# Patient Record
Sex: MALE | Race: WHITE | NOT HISPANIC OR LATINO
[De-identification: names, ages, dates, MRNs, and addresses within clinical notes are randomized per-mention and may not be internally consistent; named-entity substitution may affect disease eponyms.]

---

## 2021-12-03 ENCOUNTER — TRANSCRIPTION ENCOUNTER (OUTPATIENT)
Age: 36
End: 2021-12-03

## 2022-01-07 ENCOUNTER — OUTPATIENT (OUTPATIENT)
Dept: OUTPATIENT SERVICES | Facility: HOSPITAL | Age: 37
LOS: 1 days | End: 2022-01-07
Payer: OTHER GOVERNMENT

## 2022-01-07 ENCOUNTER — APPOINTMENT (OUTPATIENT)
Dept: MRI IMAGING | Facility: HOSPITAL | Age: 37
End: 2022-01-07

## 2022-01-07 PROBLEM — Z00.00 ENCOUNTER FOR PREVENTIVE HEALTH EXAMINATION: Status: ACTIVE | Noted: 2022-01-07

## 2022-01-07 PROCEDURE — 75561 CARDIAC MRI FOR MORPH W/DYE: CPT

## 2022-01-07 PROCEDURE — 75561 CARDIAC MRI FOR MORPH W/DYE: CPT | Mod: 26

## 2022-01-07 PROCEDURE — A9577: CPT

## 2024-04-19 ENCOUNTER — HOSPITAL ENCOUNTER (EMERGENCY)
Facility: HOSPITAL | Age: 39
Discharge: HOME | End: 2024-04-19
Attending: EMERGENCY MEDICINE
Payer: OTHER GOVERNMENT

## 2024-04-19 ENCOUNTER — APPOINTMENT (OUTPATIENT)
Dept: GENERAL RADIOLOGY | Facility: HOSPITAL | Age: 39
End: 2024-04-19
Payer: OTHER GOVERNMENT

## 2024-04-19 VITALS
DIASTOLIC BLOOD PRESSURE: 62 MMHG | SYSTOLIC BLOOD PRESSURE: 117 MMHG | BODY MASS INDEX: 27.48 KG/M2 | WEIGHT: 160.94 LBS | TEMPERATURE: 97.6 F | HEIGHT: 64 IN | RESPIRATION RATE: 21 BRPM | OXYGEN SATURATION: 98 % | HEART RATE: 58 BPM

## 2024-04-19 DIAGNOSIS — R00.2 PALPITATIONS: Primary | ICD-10-CM

## 2024-04-19 DIAGNOSIS — R00.1 BRADYCARDIA: ICD-10-CM

## 2024-04-19 LAB
ALBUMIN SERPL-MCNC: 4.9 G/DL (ref 3.5–5.2)
ALBUMIN/GLOB SERPL: 1.7 G/DL
ALP SERPL-CCNC: 76 U/L (ref 39–117)
ALT SERPL W P-5'-P-CCNC: 21 U/L (ref 1–41)
ANION GAP SERPL CALCULATED.3IONS-SCNC: 11.4 MMOL/L (ref 5–15)
AST SERPL-CCNC: 28 U/L (ref 1–40)
BASOPHILS # BLD AUTO: 0.03 10*3/MM3 (ref 0–0.2)
BASOPHILS NFR BLD AUTO: 0.5 % (ref 0–1.5)
BILIRUB SERPL-MCNC: 0.7 MG/DL (ref 0–1.2)
BUN SERPL-MCNC: 17 MG/DL (ref 6–20)
BUN/CREAT SERPL: 16.8 (ref 7–25)
CALCIUM SPEC-SCNC: 9.5 MG/DL (ref 8.6–10.5)
CHLORIDE SERPL-SCNC: 99 MMOL/L (ref 98–107)
CO2 SERPL-SCNC: 27.6 MMOL/L (ref 22–29)
CREAT SERPL-MCNC: 1.01 MG/DL (ref 0.76–1.27)
DEPRECATED RDW RBC AUTO: 38 FL (ref 37–54)
EGFRCR SERPLBLD CKD-EPI 2021: 97 ML/MIN/1.73
EOSINOPHIL # BLD AUTO: 0.18 10*3/MM3 (ref 0–0.4)
EOSINOPHIL NFR BLD AUTO: 3.1 % (ref 0.3–6.2)
ERYTHROCYTE [DISTWIDTH] IN BLOOD BY AUTOMATED COUNT: 12.7 % (ref 12.3–15.4)
GLOBULIN UR ELPH-MCNC: 2.9 GM/DL
GLUCOSE SERPL-MCNC: 98 MG/DL (ref 65–99)
HCT VFR BLD AUTO: 48.1 % (ref 37.5–51)
HGB BLD-MCNC: 16.7 G/DL (ref 13–17.7)
HOLD SPECIMEN: NORMAL
HOLD SPECIMEN: NORMAL
IMM GRANULOCYTES # BLD AUTO: 0.01 10*3/MM3 (ref 0–0.05)
IMM GRANULOCYTES NFR BLD AUTO: 0.2 % (ref 0–0.5)
LYMPHOCYTES # BLD AUTO: 2.21 10*3/MM3 (ref 0.7–3.1)
LYMPHOCYTES NFR BLD AUTO: 38.1 % (ref 19.6–45.3)
MAGNESIUM SERPL-MCNC: 2.1 MG/DL (ref 1.6–2.6)
MCH RBC QN AUTO: 28.5 PG (ref 26.6–33)
MCHC RBC AUTO-ENTMCNC: 34.7 G/DL (ref 31.5–35.7)
MCV RBC AUTO: 82.1 FL (ref 79–97)
MONOCYTES # BLD AUTO: 0.38 10*3/MM3 (ref 0.1–0.9)
MONOCYTES NFR BLD AUTO: 6.6 % (ref 5–12)
NEUTROPHILS NFR BLD AUTO: 2.99 10*3/MM3 (ref 1.7–7)
NEUTROPHILS NFR BLD AUTO: 51.5 % (ref 42.7–76)
NRBC BLD AUTO-RTO: 0 /100 WBC (ref 0–0.2)
PLATELET # BLD AUTO: 233 10*3/MM3 (ref 140–450)
PMV BLD AUTO: 9.4 FL (ref 6–12)
POTASSIUM SERPL-SCNC: 3.6 MMOL/L (ref 3.5–5.2)
PROT SERPL-MCNC: 7.8 G/DL (ref 6–8.5)
RBC # BLD AUTO: 5.86 10*6/MM3 (ref 4.14–5.8)
SODIUM SERPL-SCNC: 138 MMOL/L (ref 136–145)
TROPONIN T SERPL HS-MCNC: <6 NG/L
TSH SERPL DL<=0.05 MIU/L-ACNC: 1.32 UIU/ML (ref 0.27–4.2)
WBC NRBC COR # BLD AUTO: 5.8 10*3/MM3 (ref 3.4–10.8)
WHOLE BLOOD HOLD COAG: NORMAL
WHOLE BLOOD HOLD SPECIMEN: NORMAL

## 2024-04-19 PROCEDURE — 84443 ASSAY THYROID STIM HORMONE: CPT

## 2024-04-19 PROCEDURE — 36415 COLL VENOUS BLD VENIPUNCTURE: CPT | Performed by: EMERGENCY MEDICINE

## 2024-04-19 PROCEDURE — 93005 ELECTROCARDIOGRAM TRACING: CPT

## 2024-04-19 PROCEDURE — 71045 X-RAY EXAM CHEST 1 VIEW: CPT

## 2024-04-19 PROCEDURE — 99284 EMERGENCY DEPT VISIT MOD MDM: CPT

## 2024-04-19 PROCEDURE — 84484 ASSAY OF TROPONIN QUANT: CPT

## 2024-04-19 PROCEDURE — 80053 COMPREHEN METABOLIC PANEL: CPT | Performed by: EMERGENCY MEDICINE

## 2024-04-19 PROCEDURE — 85025 COMPLETE CBC W/AUTO DIFF WBC: CPT | Performed by: EMERGENCY MEDICINE

## 2024-04-19 PROCEDURE — 83735 ASSAY OF MAGNESIUM: CPT | Performed by: EMERGENCY MEDICINE

## 2024-04-19 PROCEDURE — 93005 ELECTROCARDIOGRAM TRACING: CPT | Performed by: EMERGENCY MEDICINE

## 2024-04-19 RX ORDER — TRAMADOL HYDROCHLORIDE 50 MG/1
TABLET ORAL
COMMUNITY
Start: 2024-03-21

## 2024-04-19 RX ORDER — ALPRAZOLAM 0.5 MG/1
TABLET ORAL
COMMUNITY
Start: 2024-04-02

## 2024-04-19 RX ORDER — SODIUM CHLORIDE 0.9 % (FLUSH) 0.9 %
10 SYRINGE (ML) INJECTION AS NEEDED
Status: DISCONTINUED | OUTPATIENT
Start: 2024-04-19 | End: 2024-04-20 | Stop reason: HOSPADM

## 2024-04-20 LAB
QT INTERVAL: 419 MS
QTC INTERVAL: 434 MS

## 2024-04-20 NOTE — DISCHARGE INSTRUCTIONS
Home to rest.  Increase your fluid intake.  If you begin experiencing chest pain, shortness of breath, weakness, confusion, or worsening of current symptoms return to the ED promptly.  A referral has been made to cardiology and you should receive a call to schedule an appointment for further evaluation.

## 2024-04-20 NOTE — ED PROVIDER NOTES
"Time: 8:12 PM EDT  Date of encounter:  4/19/2024  Independent Historian/Clinical History and Information was obtained by:   Patient    History is limited by: N/A    Chief Complaint: Palpitations      History of Present Illness:  Patient is a 38 y.o. year old male who presents to the emergency department for evaluation of potation's and 630 tonight.  States he has a history of this.  Patient reports a sensation of his heart pumping \"hard\" more in the morning.  He has previously been seen by electrophysiologist and cardiologist and has a loop recorder currently implanted.  Admits to nausea and dizziness.  Denies shortness of breath, syncope, vomiting.     HPI    Patient Care Team  Primary Care Provider: Provider, No Known    Past Medical History:     Allergies   Allergen Reactions    Polysorbate 80 (Pf) Hives    Hydrocodone Palpitations     Past Medical History:   Diagnosis Date    Asthma     GERD (gastroesophageal reflux disease)     Palpitations     Status post placement of implantable loop recorder      Past Surgical History:   Procedure Laterality Date    FOOT SURGERY      WISDOM TOOTH EXTRACTION       History reviewed. No pertinent family history.    Home Medications:  Prior to Admission medications    Not on File        Social History:   Social History     Tobacco Use    Smoking status: Never   Substance Use Topics    Alcohol use: Not Currently    Drug use: Not Currently         Review of Systems:  Review of Systems   Constitutional: Negative.    HENT: Negative.     Eyes: Negative.    Respiratory: Negative.  Negative for shortness of breath.    Cardiovascular:  Positive for palpitations.   Gastrointestinal:  Positive for nausea. Negative for vomiting.   Endocrine: Negative.    Genitourinary: Negative.    Musculoskeletal: Negative.    Skin: Negative.    Allergic/Immunologic: Negative.    Neurological:  Positive for dizziness.   Hematological: Negative.    Psychiatric/Behavioral: Negative.          Physical " "Exam:  /62   Pulse 58   Temp 97.6 °F (36.4 °C) (Oral)   Resp 21   Ht 162.6 cm (64\")   Wt 73 kg (160 lb 15 oz)   SpO2 98%   BMI 27.62 kg/m²     Physical Exam  Constitutional:       Appearance: Normal appearance.   HENT:      Head: Normocephalic.   Eyes:      Extraocular Movements: Extraocular movements intact.      Conjunctiva/sclera: Conjunctivae normal.   Pulmonary:      Effort: Pulmonary effort is normal.   Abdominal:      General: There is no distension.   Skin:     General: Skin is warm.      Coloration: Skin is not cyanotic.   Neurological:      Mental Status: He is alert and oriented to person, place, and time.   Psychiatric:         Attention and Perception: Attention and perception normal.         Mood and Affect: Mood normal.               Procedures:  Procedures      Medical Decision Making:      Comorbidities that affect care:    GERD, palpations, Asthma    External Notes reviewed:    Previous Clinic Note: Patient was seen by you available orthopedics on 2/22/2024 for left foot neuritis.      The following orders were placed and all results were independently analyzed by me:  Orders Placed This Encounter   Procedures    XR Chest 1 View    [place] Draw    Comprehensive Metabolic Panel    Magnesium    Single High Sensitivity Troponin T    CBC Auto Differential    TSH Rfx On Abnormal To Free T4    Ambulatory Referral to Cardiology    NPO Diet NPO Type: Strict NPO    Undress & Gown    Continuous Pulse Oximetry    Oxygen Therapy- Nasal Cannula; Titrate 1-6 LPM Per SpO2; 90 - 95%    ECG 12 Lead ED Triage Standing Order; Dysrhythmia    Insert Peripheral IV    CBC & Differential    Green Top (Gel)    Lavender Top    Gold Top - SST    Light Blue Top       Medications Given in the Emergency Department:  Medications   sodium chloride 0.9 % flush 10 mL (has no administration in time range)        ED Course:    ED Course as of 04/20/24 0101   Fri Apr 19, 2024 2013 --- PROVIDER IN TRIAGE NOTE ---    The " patient was evaluated by Ana wiggins in triage. Orders were placed and the patient is currently awaiting disposition.    [AJ]      ED Course User Index  [AJ] Ana Flores PA-C       Labs:    Lab Results (last 24 hours)       Procedure Component Value Units Date/Time    CBC & Differential [[id number]]  (Abnormal) Collected: 04/19/24 2019    Specimen: Blood from Arm, Right Updated: 04/19/24 2025    Narrative:      The following orders were created for panel order CBC & Differential.  Procedure                               Abnormality         Status                     ---------                               -----------         ------                     CBC Auto Differential[[id number]]        Abnormal            Final result                 Please view results for these tests on the individual orders.    Comprehensive Metabolic Panel [[id number]] Collected: 04/19/24 2019    Specimen: Blood from Arm, Right Updated: 04/19/24 2045     Glucose 98 mg/dL      BUN 17 mg/dL      Creatinine 1.01 mg/dL      Sodium 138 mmol/L      Potassium 3.6 mmol/L      Chloride 99 mmol/L      CO2 27.6 mmol/L      Calcium 9.5 mg/dL      Total Protein 7.8 g/dL      Albumin 4.9 g/dL      ALT (SGPT) 21 U/L      AST (SGOT) 28 U/L      Alkaline Phosphatase 76 U/L      Total Bilirubin 0.7 mg/dL      Globulin 2.9 gm/dL      A/G Ratio 1.7 g/dL      BUN/Creatinine Ratio 16.8     Anion Gap 11.4 mmol/L      eGFR 97.0 mL/min/1.73     Narrative:      GFR Normal >60  Chronic Kidney Disease <60  Kidney Failure <15      Magnesium [[id number]]  (Normal) Collected: 04/19/24 2019    Specimen: Blood from Arm, Right Updated: 04/19/24 2045     Magnesium 2.1 mg/dL     Single High Sensitivity Troponin T [[id number]]  (Normal) Collected: 04/19/24 2019    Specimen: Blood from Arm, Right Updated: 04/19/24 2051     HS Troponin T <6 ng/L     Narrative:      High Sensitive Troponin T Reference Range:  <14.0 ng/L- Negative Female for AMI  <22.0 ng/L- Negative  Male for AMI  >=14 - Abnormal Female indicating possible myocardial injury.  >=22 - Abnormal Male indicating possible myocardial injury.   Clinicians would have to utilize clinical acumen, EKG, Troponin, and serial changes to determine if it is an Acute Myocardial Infarction or myocardial injury due to an underlying chronic condition.         CBC Auto Differential [[id number]]  (Abnormal) Collected: 04/19/24 2019    Specimen: Blood from Arm, Right Updated: 04/19/24 2025     WBC 5.80 10*3/mm3      RBC 5.86 10*6/mm3      Hemoglobin 16.7 g/dL      Hematocrit 48.1 %      MCV 82.1 fL      MCH 28.5 pg      MCHC 34.7 g/dL      RDW 12.7 %      RDW-SD 38.0 fl      MPV 9.4 fL      Platelets 233 10*3/mm3      Neutrophil % 51.5 %      Lymphocyte % 38.1 %      Monocyte % 6.6 %      Eosinophil % 3.1 %      Basophil % 0.5 %      Immature Grans % 0.2 %      Neutrophils, Absolute 2.99 10*3/mm3      Lymphocytes, Absolute 2.21 10*3/mm3      Monocytes, Absolute 0.38 10*3/mm3      Eosinophils, Absolute 0.18 10*3/mm3      Basophils, Absolute 0.03 10*3/mm3      Immature Grans, Absolute 0.01 10*3/mm3      nRBC 0.0 /100 WBC     TSH Rfx On Abnormal To Free T4 [[id number]]  (Normal) Collected: 04/19/24 2019    Specimen: Blood from Arm, Right Updated: 04/19/24 2051     TSH 1.320 uIU/mL              Imaging:    XR Chest 1 View    Result Date: 4/19/2024  XR CHEST 1 VW-  Date of Exam: 4/19/2024 8:39 PM  Indication: Dysrhythmia Triage Protocol  Comparison: None available.  Findings: Implantable device overlies the heart likely due to small pacemaker. Lungs are normally expanded. Heart size is within normal limits. No significant pneumothorax, pleural effusion or focal pulmonary parenchymal opacity. Bones and soft tissues are within normal limits.    No acute cardiopulmonary abnormality.    Electronically Signed By-Paty García MD On:4/19/2024 8:49 PM         Differential Diagnosis and Discussion:    Extremity Pain: Differential diagnosis includes but  is not limited to soft tissue sprain, tendonitis, tendon injury, dislocation, fracture, deep vein thrombosis, arterial insufficiency, osteoarthritis, bursitis, and ligamentous damage.    All labs were reviewed and interpreted by me.  All X-rays impressions were independently interpreted by me.  EKG was interpreted by me.    MDM       Patient Care Considerations:    DUPLEX ULTRASOUND: I considered ordering a duplex ultrasound, however the patient is low risk for dvt and has no signs of arterial occlusion.      Consultants/Shared Management Plan:    None    Social Determinants of Health:    Patient is independent, reliable, and has access to care.       Disposition and Care Coordination:    Discharged: The patient is suitable and stable for discharge with no need for consideration of admission.    I have explained the patient´s condition, diagnoses and treatment plan based on the information available to me at this time. I have answered questions and addressed any concerns. The patient has a good  understanding of the patient´s diagnosis, condition, and treatment plan as can be expected at this point. The vital signs have been stable. The patient´s condition is stable and appropriate for discharge from the emergency department.      The patient will pursue further outpatient evaluation with the primary care physician or other designated or consulting physician as outlined in the discharge instructions. They are agreeable to this plan of care and follow-up instructions have been explained in detail. The patient has received these instructions in written format and has expressed an understanding of the discharge instructions. The patient is aware that any significant change in condition or worsening of symptoms should prompt an immediate return to this or the closest emergency department or call to 911.    Final diagnoses:   Palpitations   Bradycardia        ED Disposition       ED Disposition   Discharge    Condition    Stable    Comment   --               This medical record created using voice recognition software.             Simona Nagel, APRN  04/20/24 010

## 2024-05-09 ENCOUNTER — OFFICE VISIT (OUTPATIENT)
Dept: CARDIOLOGY | Facility: CLINIC | Age: 39
End: 2024-05-09
Payer: OTHER GOVERNMENT

## 2024-05-09 VITALS
SYSTOLIC BLOOD PRESSURE: 118 MMHG | HEIGHT: 64 IN | BODY MASS INDEX: 28.85 KG/M2 | WEIGHT: 169 LBS | HEART RATE: 54 BPM | DIASTOLIC BLOOD PRESSURE: 78 MMHG

## 2024-05-09 DIAGNOSIS — R42 DIZZINESS: ICD-10-CM

## 2024-05-09 DIAGNOSIS — I47.10 PAROXYSMAL SVT (SUPRAVENTRICULAR TACHYCARDIA): ICD-10-CM

## 2024-05-09 DIAGNOSIS — R00.2 PALPITATIONS: Primary | ICD-10-CM

## 2024-05-09 DIAGNOSIS — R07.89 CHEST PAIN, ATYPICAL: ICD-10-CM

## 2024-05-10 ENCOUNTER — TELEPHONE (OUTPATIENT)
Dept: CARDIOLOGY | Facility: CLINIC | Age: 39
End: 2024-05-10

## 2024-06-03 ENCOUNTER — CLINICAL SUPPORT NO REQUIREMENTS (OUTPATIENT)
Dept: CARDIOLOGY | Facility: CLINIC | Age: 39
End: 2024-06-03
Payer: OTHER GOVERNMENT

## 2024-06-03 DIAGNOSIS — Z95.818 IMPLANTABLE LOOP RECORDER PRESENT: Primary | ICD-10-CM

## 2024-06-03 DIAGNOSIS — R00.2 PALPITATIONS: ICD-10-CM

## 2024-06-03 DIAGNOSIS — I47.10 PAROXYSMAL SVT (SUPRAVENTRICULAR TACHYCARDIA): ICD-10-CM

## 2024-06-03 NOTE — PROGRESS NOTES
MDT Loop recorder interrogation, Implanted 03/24/2022 for Ventricular Tachycardia.  Patient has had 5 patient symptom downloads since last check on 11/17/2023 from previous Cardiologist in [place].    The downloads that were recorded were Normal Sinus with occasional PVC's.  I have requested download of Loop Recorder home remote downloads.

## 2024-06-19 ENCOUNTER — HOSPITAL ENCOUNTER (OUTPATIENT)
Dept: CARDIOLOGY | Facility: HOSPITAL | Age: 39
Discharge: HOME | End: 2024-06-19
Payer: OTHER GOVERNMENT

## 2024-06-19 VITALS — WEIGHT: 159 LBS | HEIGHT: 64 IN | BODY MASS INDEX: 27.14 KG/M2

## 2024-06-19 DIAGNOSIS — R07.89 CHEST PAIN, ATYPICAL: ICD-10-CM

## 2024-06-19 DIAGNOSIS — R00.2 PALPITATIONS: ICD-10-CM

## 2024-06-19 PROCEDURE — 93306 TTE W/DOPPLER COMPLETE: CPT

## 2024-06-19 PROCEDURE — 93017 CV STRESS TEST TRACING ONLY: CPT

## 2024-06-20 LAB
AORTIC DIMENSIONLESS INDEX: 0.87 (DI)
ASCENDING AORTA: 2.7 CM
BH CV ECHO MEAS - ACS: 2 CM
BH CV ECHO MEAS - AO MAX PG: 6.2 MMHG
BH CV ECHO MEAS - AO MEAN PG: 3 MMHG
BH CV ECHO MEAS - AO ROOT DIAM: 3.1 CM
BH CV ECHO MEAS - AO V2 MAX: 124 CM/SEC
BH CV ECHO MEAS - AO V2 VTI: 29 CM
BH CV ECHO MEAS - AVA(I,D): 2.6 CM2
BH CV ECHO MEAS - EDV(CUBED): 103.8 ML
BH CV ECHO MEAS - EDV(MOD-SP2): 86 ML
BH CV ECHO MEAS - EDV(MOD-SP4): 110 ML
BH CV ECHO MEAS - EF(MOD-BP): 60.7 %
BH CV ECHO MEAS - EF(MOD-SP2): 64.1 %
BH CV ECHO MEAS - EF(MOD-SP4): 58.4 %
BH CV ECHO MEAS - ESV(CUBED): 35.9 ML
BH CV ECHO MEAS - ESV(MOD-SP2): 30.9 ML
BH CV ECHO MEAS - ESV(MOD-SP4): 45.8 ML
BH CV ECHO MEAS - FS: 29.8 %
BH CV ECHO MEAS - IVS/LVPW: 1 CM
BH CV ECHO MEAS - IVSD: 0.9 CM
BH CV ECHO MEAS - LA DIMENSION: 3.2 CM
BH CV ECHO MEAS - LAT PEAK E' VEL: 16.6 CM/SEC
BH CV ECHO MEAS - LV DIASTOLIC VOL/BSA (35-75): 60.4 CM2
BH CV ECHO MEAS - LV MASS(C)D: 142.7 GRAMS
BH CV ECHO MEAS - LV MAX PG: 4.8 MMHG
BH CV ECHO MEAS - LV MEAN PG: 2 MMHG
BH CV ECHO MEAS - LV SYSTOLIC VOL/BSA (12-30): 25.2 CM2
BH CV ECHO MEAS - LV V1 MAX: 110 CM/SEC
BH CV ECHO MEAS - LV V1 VTI: 23.8 CM
BH CV ECHO MEAS - LVIDD: 4.7 CM
BH CV ECHO MEAS - LVIDS: 3.3 CM
BH CV ECHO MEAS - LVOT AREA: 3.1 CM2
BH CV ECHO MEAS - LVOT DIAM: 2 CM
BH CV ECHO MEAS - LVPWD: 0.9 CM
BH CV ECHO MEAS - MED PEAK E' VEL: 9.5 CM/SEC
BH CV ECHO MEAS - MV A MAX VEL: 62.4 CM/SEC
BH CV ECHO MEAS - MV DEC SLOPE: 636 CM/SEC2
BH CV ECHO MEAS - MV DEC TIME: 0.2 SEC
BH CV ECHO MEAS - MV E MAX VEL: 89.1 CM/SEC
BH CV ECHO MEAS - MV E/A: 1.43
BH CV ECHO MEAS - MV MEAN PG: 1 MMHG
BH CV ECHO MEAS - MV P1/2T: 48.8 MSEC
BH CV ECHO MEAS - MV V2 VTI: 34 CM
BH CV ECHO MEAS - MVA(P1/2T): 4.5 CM2
BH CV ECHO MEAS - MVA(VTI): 2.2 CM2
BH CV ECHO MEAS - PA V2 MAX: 104 CM/SEC
BH CV ECHO MEAS - PI END-D VEL: 94.6 CM/SEC
BH CV ECHO MEAS - PULM A REVS DUR: 0.13 SEC
BH CV ECHO MEAS - PULM A REVS VEL: 27.5 CM/SEC
BH CV ECHO MEAS - PULM DIAS VEL: 44.5 CM/SEC
BH CV ECHO MEAS - PULM S/D: 1.15
BH CV ECHO MEAS - PULM SYS VEL: 51.2 CM/SEC
BH CV ECHO MEAS - QP/QS: 0.96
BH CV ECHO MEAS - RAP SYSTOLE: 5 MMHG
BH CV ECHO MEAS - RV MAX PG: 3.1 MMHG
BH CV ECHO MEAS - RV V1 MAX: 87.8 CM/SEC
BH CV ECHO MEAS - RV V1 VTI: 20.7 CM
BH CV ECHO MEAS - RVDD: 3 CM
BH CV ECHO MEAS - RVOT DIAM: 2.1 CM
BH CV ECHO MEAS - RVSP: 27.8 MMHG
BH CV ECHO MEAS - SV(LVOT): 74.8 ML
BH CV ECHO MEAS - SV(MOD-SP2): 55.1 ML
BH CV ECHO MEAS - SV(MOD-SP4): 64.2 ML
BH CV ECHO MEAS - SV(RVOT): 71.7 ML
BH CV ECHO MEAS - SVI(LVOT): 41.1 ML/M2
BH CV ECHO MEAS - SVI(MOD-SP2): 30.3 ML/M2
BH CV ECHO MEAS - SVI(MOD-SP4): 35.3 ML/M2
BH CV ECHO MEAS - TAPSE (>1.6): 1.6 CM
BH CV ECHO MEAS - TR MAX PG: 22.8 MMHG
BH CV ECHO MEAS - TR MAX VEL: 239 CM/SEC
BH CV ECHO MEASUREMENTS AVERAGE E/E' RATIO: 6.83
BH CV IMMEDIATE POST RECOVERY TECH DATA SYMPTOMS: NORMAL
BH CV IMMEDIATE POST TECH DATA BLOOD PRESSURE: NORMAL MMHG
BH CV IMMEDIATE POST TECH DATA HEART RATE: 162 BPM
BH CV NINE MINUTE RECOVERY TECH DATA BLOOD PRESSURE: NORMAL MMHG
BH CV NINE MINUTE RECOVERY TECH DATA HEART RATE: 85 BPM
BH CV NINE MINUTE RECOVERY TECH DATA SYMPTOMS: NORMAL
BH CV SIX MINUTE RECOVERY TECH DATA BLOOD PRESSURE: NORMAL
BH CV SIX MINUTE RECOVERY TECH DATA HEART RATE: 101 BPM
BH CV SIX MINUTE RECOVERY TECH DATA SYMPTOMS: NORMAL
BH CV STRESS BP STAGE 1: NORMAL
BH CV STRESS BP STAGE 2: NORMAL
BH CV STRESS BP STAGE 3: NORMAL
BH CV STRESS DURATION MIN STAGE 1: 3
BH CV STRESS DURATION MIN STAGE 2: 3
BH CV STRESS DURATION MIN STAGE 3: 3
BH CV STRESS DURATION MIN STAGE 4: 1
BH CV STRESS DURATION SEC STAGE 1: 0
BH CV STRESS DURATION SEC STAGE 2: 0
BH CV STRESS DURATION SEC STAGE 3: 0
BH CV STRESS DURATION SEC STAGE 4: 0
BH CV STRESS GRADE STAGE 1: 10
BH CV STRESS GRADE STAGE 2: 12
BH CV STRESS GRADE STAGE 3: 14
BH CV STRESS GRADE STAGE 4: 16
BH CV STRESS HR STAGE 1: 86
BH CV STRESS HR STAGE 2: 104
BH CV STRESS HR STAGE 3: 160
BH CV STRESS HR STAGE 4: 176
BH CV STRESS METS STAGE 1: 5
BH CV STRESS METS STAGE 2: 7.5
BH CV STRESS METS STAGE 3: 10
BH CV STRESS METS STAGE 4: 13.5
BH CV STRESS PROTOCOL 1: NORMAL
BH CV STRESS RECOVERY BP: NORMAL MMHG
BH CV STRESS RECOVERY HR: 92 BPM
BH CV STRESS SPEED STAGE 1: 1.7
BH CV STRESS SPEED STAGE 2: 2.5
BH CV STRESS SPEED STAGE 3: 3.4
BH CV STRESS SPEED STAGE 4: 4.2
BH CV STRESS STAGE 1: 1
BH CV STRESS STAGE 2: 2
BH CV STRESS STAGE 3: 3
BH CV STRESS STAGE 4: 4
BH CV THREE MINUTE POST TECH DATA BLOOD PRESSURE: NORMAL MMHG
BH CV THREE MINUTE POST TECH DATA HEART RATE: 105 BPM
BH CV TWELVE MINUTE RECOVERY TECH DATA BLOOD PRESSURE: NORMAL MMHG
BH CV TWELVE MINUTE RECOVERY TECH DATA HEART RATE: 92 BPM
BH CV TWELVE MINUTE RECOVERY TECH DATA SYMPTOMS: NORMAL
BH CV XLRA - TDI S': 11.7 CM/SEC
IVRT: 90 MS
LEFT ATRIUM VOLUME INDEX: 23 ML/M2
MAXIMAL PREDICTED HEART RATE: 180 BPM
PERCENT MAX PREDICTED HR: 97.78 %
STRESS BASELINE BP: NORMAL MMHG
STRESS BASELINE HR: 50 BPM
STRESS PERCENT HR: 115 %
STRESS POST ESTIMATED WORKLOAD: 11.8 METS
STRESS POST EXERCISE DUR MIN: 10 MIN
STRESS POST EXERCISE DUR SEC: 0 SEC
STRESS POST PEAK BP: NORMAL MMHG
STRESS POST PEAK HR: 176 BPM
STRESS TARGET HR: 153 BPM

## 2024-06-27 ENCOUNTER — OFFICE VISIT (OUTPATIENT)
Dept: CARDIOLOGY | Facility: CLINIC | Age: 39
End: 2024-06-27
Payer: OTHER GOVERNMENT

## 2024-06-27 VITALS
WEIGHT: 161 LBS | HEIGHT: 64 IN | BODY MASS INDEX: 27.49 KG/M2 | HEART RATE: 92 BPM | SYSTOLIC BLOOD PRESSURE: 116 MMHG | DIASTOLIC BLOOD PRESSURE: 74 MMHG

## 2024-06-27 DIAGNOSIS — I47.10 PAROXYSMAL SVT (SUPRAVENTRICULAR TACHYCARDIA): ICD-10-CM

## 2024-06-27 DIAGNOSIS — R00.2 PALPITATIONS: Primary | ICD-10-CM

## 2024-06-27 DIAGNOSIS — Z95.818 IMPLANTABLE LOOP RECORDER PRESENT: ICD-10-CM

## 2024-06-27 DIAGNOSIS — R42 DIZZINESS: ICD-10-CM

## 2024-06-27 NOTE — PROGRESS NOTES
Chief Complaint  Follow-up (Post testing) and Palpitations    Subjective        History of Present Illness  Jd Peguero presents to [facility] for follow up.   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  He complains of sporadic episodes of bradycardia associated with dizziness and fatigue.  He has not had any recurrence of these in the last couple of weeks.  He has intermittent palpitations/strong heartbeat sensation, mostly in the evenings.  He denies chest pain or pressure.  He notes no dyspnea, orthopnea, edema, syncope.    Past Medical History:   Diagnosis Date    Asthma     GERD (gastroesophageal reflux disease)     Palpitations     Status post placement of implantable loop recorder        ALLERGY  Allergies   Allergen Reactions    Covid-19 Mrna Vacc (Moderna) Anaphylaxis     Pt states he is allergic to all the COVID injections    Methylprednisolone Anaphylaxis    Sorbitan Anaphylaxis    Polyethylene Other (See Comments)    Polysorbate 80 (Pf) Hives    Hydrocodone Palpitations        Past Surgical History:   Procedure Laterality Date    BUNIONECTOMY Left 04/2023    FOOT SURGERY      WISDOM TOOTH EXTRACTION          Social History     Socioeconomic History    Marital status:    Tobacco Use    Smoking status: Former     Types: Cigarettes     Start date: 11/2019   Substance and Sexual Activity    Alcohol use: Not Currently    Drug use: Not Currently       History reviewed. No pertinent family history.     Current Outpatient Medications on File Prior to Visit   Medication Sig    ALPRAZolam (XANAX) 0.5 MG tablet     traMADol (ULTRAM) 50 MG tablet      No current facility-administered medications on file prior to visit.       Objective   Vitals:    06/27/24 1520   BP: 116/74   BP Location: Right arm  "  Pulse: 92   Weight: 73 kg (161 lb)   Height: 162.6 cm (64\")       Physical Exam  Constitutional:       General: He is awake. He is not in acute distress.     Appearance: Normal appearance.   HENT:      Head: Normocephalic.      Nose: Nose normal. No congestion.   Eyes:      Extraocular Movements: Extraocular movements intact.      Conjunctiva/sclera: Conjunctivae normal.      Pupils: Pupils are equal, round, and reactive to light.   Neck:      Thyroid: No thyromegaly.      Vascular: No JVD.   Cardiovascular:      Rate and Rhythm: Normal rate and regular rhythm.      Chest Wall: PMI is not displaced.      Pulses: Normal pulses.      Heart sounds: Normal heart sounds, S1 normal and S2 normal. No murmur heard.     No friction rub. No gallop. No S3 or S4 sounds.   Pulmonary:      Effort: Pulmonary effort is normal.      Breath sounds: Normal breath sounds. No wheezing, rhonchi or rales.   Abdominal:      General: Bowel sounds are normal.      Palpations: Abdomen is soft.      Tenderness: There is no abdominal tenderness.   Musculoskeletal:      Cervical back: No tenderness.      Right lower leg: No edema.      Left lower leg: No edema.   Lymphadenopathy:      Cervical: No cervical adenopathy.   Skin:     General: Skin is warm and dry.      Capillary Refill: Capillary refill takes less than 2 seconds.      Coloration: Skin is not cyanotic.      Findings: No petechiae or rash.      Nails: There is no clubbing.   Neurological:      Mental Status: He is alert.   Psychiatric:         Mood and Affect: Mood normal.         Behavior: Behavior is cooperative.           Result Review     The following data was reviewed by CHAPIS Emerson on 06/27/24.      CMP          4/19/2024    20:19   CMP   Glucose 98    BUN 17    Creatinine 1.01    EGFR 97.0    Sodium 138    Potassium 3.6    Chloride 99    Calcium 9.5    Total Protein 7.8    Albumin 4.9    Globulin 2.9    Total Bilirubin 0.7    Alkaline Phosphatase 76    AST " (SGOT) 28    ALT (SGPT) 21    Albumin/Globulin Ratio 1.7    BUN/Creatinine Ratio 16.8    Anion Gap 11.4      CBC w/diff          4/19/2024    20:19   CBC w/Diff   WBC 5.80    RBC 5.86    Hemoglobin 16.7    Hematocrit 48.1    MCV 82.1    MCH 28.5    MCHC 34.7    RDW 12.7    Platelets 233    Neutrophil Rel % 51.5    Immature Granulocyte Rel % 0.2    Lymphocyte Rel % 38.1    Monocyte Rel % 6.6    Eosinophil Rel % 3.1    Basophil Rel % 0.5           Results for orders placed during the hospital encounter of 06/19/24    Adult Transthoracic Echo Complete W/ Cont if Necessary Per Protocol    Interpretation Summary    Left ventricular systolic function is normal. Calculated left ventricular EF = 60.7%    Left ventricular diastolic function was normal.    Estimated right ventricular systolic pressure from tricuspid regurgitation is normal (<35 mmHg).    Results for orders placed during the hospital encounter of 06/19/24    Treadmill Stress Test    Interpretation Summary  Adequate aerobic functional capacity.  Maximum workload achieved was 11.8 METS.  Patient reports chest discomfort and shortness of breath within stage III of Emmanuel protocol.  Resting blood pressure was normal with slightly exaggerated blood pressure response to exercise.  Normal heart rate response to exercise.  No significant arrhythmias were seen.  Baseline ECG shows normal sinus rhythm.  At peak stress, no ischemic ST-T changes were noted.  Impressions are consistent with low risk study.    No results found for this or any previous visit.          Procedures    Assessment & Plan  Diagnoses and all orders for this visit:    1. Palpitations (Primary)    2. Implantable loop recorder present    3. Paroxysmal SVT (supraventricular tachycardia)    4. Dizziness      Assessment & Plan      1.  Patient with ongoing palpitations.  Recent loop recorder interrogation demonstrated normal sinus rhythm with PVCs.  If symptoms persist can consider Holter monitor to see  if symptoms correlate with PVCs.  2.  Recent device interrogation was unremarkable.  3.  History of PSVT without recurrence on recent loop recorder.  4.  Patient's dizziness he felt was associated with a low heart rate but he has not had any further episodes of this.  Again may consider Holter monitor in the future if symptoms persist or or become more frequent.        The medical services provided during this encounter are part of ongoing care related to this patient's single serious condition or complex condition.        Follow Up   Return in about 6 months (around 12/27/2024) for With CHAPIS Morales.    Patient was given instructions and counseling regarding his condition or for health maintenance advice. Please see specific information pulled into the AVS if appropriate.         CHAPIS Emerson  06/27/24  15:24 EDT    Dictated Utilizing Dragon Dictation

## 2024-11-20 NOTE — PROGRESS NOTES
Chief Complaint  Headache (dizziness)    Subjective            Jd Peguero presents to [facility] for MUSCLE WEAKNESS AND PARESTHESIA  History of Present Illness  New patient referred by [facility] for muscle weakness and paresthesia.     Muscle weakness due to injury in right arm, lost 50% strength and has CTS, probably need surgery   It happened a couple of years ago, he was doing some training and landed on his elbow.   He did get EMG done and has appointment with Dr. Mckeon next month.   He was referred to Neurosurgeon  and he was referred to Dr. 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  Never saw anyone for these concussions.   No LOC, 2017 concussion with N/V headache  Patient has a long history of traumatic brain injuries     He complains of dizziness and sometimes looses his balance. Usually when turning to the left.   A few months ago he was driving and had a weird dizzy sensation.   No MRI's of the brain,      Has had therapy and evaluation for dizziness,   Now sporadic LOC  Recently headaches  In past had tension headaches,  Occasional migraine, stabbing,  usually left frontal no associated dizziness, fatigue, feels better laying down  bifrontal  tylenol  In past year ha once per month or two               History reviewed. No pertinent family history.    Past Medical History:   Diagnosis Date    Asthma     GERD (gastroesophageal reflux disease)     Palpitations     Status post placement of implantable loop recorder        Social History     Socioeconomic History    Marital status:    Tobacco Use    Smoking status: Former     Types: Cigarettes     Start date: 11/2019    Smokeless tobacco: Never   Vaping Use    Vaping status: Never Used   Substance and Sexual  "Activity    Alcohol use: Not Currently    Drug use: Not Currently    Sexual activity: Defer         Current Outpatient Medications:     ALPRAZolam (XANAX) 0.5 MG tablet, , Disp: , Rfl:     Diclofenac Sodium (VOLTAREN) 1 % gel gel, Apply 4 g topically to the appropriate area as directed 4 (Four) Times a Day As Needed., Disp: , Rfl:     EPINEPHrine (EPIPEN 2-LORRI IJ), Inject  as directed., Disp: , Rfl:     lidocaine (LIDODERM) 5 %, Place 1 patch on the skin as directed by provider Daily. Remove & Discard patch within 12 hours or as directed by MD, Disp: , Rfl:     traMADol (ULTRAM) 50 MG tablet, , Disp: , Rfl:     Review of Systems   HENT:  Positive for ear pain, hearing loss and tinnitus.    Cardiovascular:  Positive for palpitations.   Gastrointestinal:  Positive for constipation.   Musculoskeletal:  Positive for neck stiffness.   Neurological:  Positive for dizziness, light-headedness and headaches.   Psychiatric/Behavioral:  Positive for sleep disturbance. The patient is nervous/anxious.    All other systems reviewed and are negative.           Objective   Vital Signs:   /74   Pulse (!) 47   Ht 162.6 cm (64\")   Wt 73.9 kg (163 lb)   BMI 27.98 kg/m²     Physical Exam  Vitals reviewed.   Constitutional:       Appearance: Normal appearance.   Eyes:      Extraocular Movements: Extraocular movements intact.   Pulmonary:      Effort: Pulmonary effort is normal. No respiratory distress.   Neurological:      Mental Status: He is oriented to person, place, and time.      Deep Tendon Reflexes:      Reflex Scores:       Bicep reflexes are 2+ on the right side and 2+ on the left side.       Patellar reflexes are 2+ on the right side and 2+ on the left side.  Psychiatric:         Mood and Affect: Mood normal.         Speech: Speech normal.        Result Review :                Neurological Exam  Mental Status  Awake, alert and oriented to person, place and time. Oriented to person, place and time. Oriented to person, " place, and time. Speech is normal.    Cranial Nerves  CN III, IV, VI: Extraocular movements intact bilaterally.    Motor  Normal muscle bulk throughout. Normal muscle tone. No pronator drift.    Sensory  Sensation is intact to light touch, pinprick, vibration and proprioception in all four extremities.    Reflexes                                            Right                      Left  Biceps                                 2+                         2+  Patellar                                2+                         2+    Coordination  Right: Finger-to-nose normal.Left: Finger-to-nose normal.    Gait  Normal casual, toe, heel and tandem gait.             Assessment and Plan    Diagnoses and all orders for this visit:    1. Dizziness (Primary)  -     MRI Brain With & Without Contrast; Future    2. Other vascular headache  -     MRI Brain With & Without Contrast; Future      Given the headaches, dizziness and history of CHI will request an mri brain    Continue otc medication for ha         Follow Up   Return if symptoms worsen or fail to improve.  Patient was given instructions and counseling regarding his condition or for health maintenance advice. Please see specific information pulled into the AVS if appropriate.         This document has been electronically signed by Joseph Seipel, MD on November 21, 2024 13:34 EST

## 2024-11-21 ENCOUNTER — OFFICE VISIT (OUTPATIENT)
Dept: NEUROLOGY | Facility: CLINIC | Age: 39
End: 2024-11-21
Payer: OTHER GOVERNMENT

## 2024-11-21 VITALS
DIASTOLIC BLOOD PRESSURE: 74 MMHG | HEIGHT: 64 IN | SYSTOLIC BLOOD PRESSURE: 121 MMHG | BODY MASS INDEX: 27.83 KG/M2 | HEART RATE: 47 BPM | WEIGHT: 163 LBS

## 2024-11-21 DIAGNOSIS — R42 DIZZINESS: Primary | ICD-10-CM

## 2024-11-21 DIAGNOSIS — G44.1 OTHER VASCULAR HEADACHE: ICD-10-CM

## 2024-11-21 PROCEDURE — 99204 OFFICE O/P NEW MOD 45 MIN: CPT | Performed by: PSYCHIATRY & NEUROLOGY

## 2024-11-21 RX ORDER — LIDOCAINE 50 MG/G
1 PATCH TOPICAL EVERY 24 HOURS
COMMUNITY

## 2024-11-22 ENCOUNTER — PATIENT ROUNDING (BHMG ONLY) (OUTPATIENT)
Dept: NEUROLOGY | Facility: CLINIC | Age: 39
End: 2024-11-22
Payer: OTHER GOVERNMENT

## 2025-01-15 ENCOUNTER — OFFICE VISIT (OUTPATIENT)
Dept: CARDIOLOGY | Facility: CLINIC | Age: 40
End: 2025-01-15
Payer: OTHER GOVERNMENT

## 2025-01-15 VITALS
HEIGHT: 64 IN | OXYGEN SATURATION: 99 % | HEART RATE: 58 BPM | BODY MASS INDEX: 28.99 KG/M2 | SYSTOLIC BLOOD PRESSURE: 135 MMHG | DIASTOLIC BLOOD PRESSURE: 92 MMHG | WEIGHT: 169.8 LBS

## 2025-01-15 DIAGNOSIS — I47.10 PAROXYSMAL SVT (SUPRAVENTRICULAR TACHYCARDIA): ICD-10-CM

## 2025-01-15 DIAGNOSIS — Z95.818 IMPLANTABLE LOOP RECORDER PRESENT: Primary | ICD-10-CM

## 2025-01-15 NOTE — PROGRESS NOTES
Chief Complaint  Palpitations (6 month follow up. Having surgery, needs to discuss)    Subjective        History of Present Illness  Jd Peguero presents to [facility] for follow up.   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  He is doing well from a cardiac standpoint.  He has no complaints or concerns today.  He denies any chest pain or discomfort, dyspnea, palpitations, edema or syncope    Past Medical History:   Diagnosis Date    Arrhythmia 2021    After Pfizer COVID Vaccine    Asthma     GERD (gastroesophageal reflux disease)     Palpitations     Status post placement of implantable loop recorder        ALLERGY  Allergies   Allergen Reactions    Covid-19 Mrna Vacc (Moderna) Anaphylaxis     Pt states he is allergic to all the COVID injections    Methylprednisolone Anaphylaxis    Sorbitan Anaphylaxis    Polyethylene Other (See Comments)    Polysorbate 80 (Pf) Hives    Hydrocodone Palpitations        Past Surgical History:   Procedure Laterality Date    BUNIONECTOMY Left 2023    FOOT SURGERY      WISDOM TOOTH EXTRACTION          Social History     Socioeconomic History    Marital status:    Tobacco Use    Smoking status: Former     Current packs/day: 0.00     Average packs/day: 2.0 packs/day for 15.0 years (30.0 ttl pk-yrs)     Types: Cigarettes     Start date: 2000     Quit date: 2019     Years since quittin.2     Passive exposure: Past    Smokeless tobacco: Never   Vaping Use    Vaping status: Never Used   Substance and Sexual Activity    Alcohol use: Not Currently    Drug use: Never    Sexual activity: Yes     Partners: Female     Birth control/protection: None       Family History   Problem Relation Age of Onset    Arrhythmia Sister         Leaky tricuspid and superventricular tachycardia    Asthma Sister   "       Current Outpatient Medications on File Prior to Visit   Medication Sig    ALPRAZolam (XANAX) 0.5 MG tablet     Diclofenac Sodium (VOLTAREN) 1 % gel gel Apply 4 g topically to the appropriate area as directed 4 (Four) Times a Day As Needed.    EPINEPHrine (EPIPEN 2-LORRI IJ) Inject  as directed.    lidocaine (LIDODERM) 5 % Place 1 patch on the skin as directed by provider Daily. Remove & Discard patch within 12 hours or as directed by MD    traMADol (ULTRAM) 50 MG tablet      No current facility-administered medications on file prior to visit.       Objective   Vitals:    01/15/25 1336 01/15/25 1337   BP: 136/90 135/92   BP Location: Left arm    Patient Position: Sitting    Cuff Size: Adult    Pulse: 58    SpO2: 99%    Weight: 77 kg (169 lb 12.8 oz)    Height: 162.6 cm (64\")        Physical Exam  Constitutional:       General: He is awake. He is not in acute distress.     Appearance: Normal appearance.   HENT:      Head: Normocephalic.      Nose: Nose normal. No congestion.   Eyes:      Extraocular Movements: Extraocular movements intact.      Conjunctiva/sclera: Conjunctivae normal.      Pupils: Pupils are equal, round, and reactive to light.   Neck:      Thyroid: No thyromegaly.      Vascular: No JVD.   Cardiovascular:      Rate and Rhythm: Normal rate and regular rhythm.      Chest Wall: PMI is not displaced.      Pulses: Normal pulses.      Heart sounds: Normal heart sounds, S1 normal and S2 normal. No murmur heard.     No friction rub. No gallop. No S3 or S4 sounds.   Pulmonary:      Effort: Pulmonary effort is normal.      Breath sounds: Normal breath sounds. No wheezing, rhonchi or rales.   Abdominal:      General: Bowel sounds are normal.      Palpations: Abdomen is soft.      Tenderness: There is no abdominal tenderness.   Musculoskeletal:      Cervical back: No tenderness.      Right lower leg: No edema.      Left lower leg: No edema.   Lymphadenopathy:      Cervical: No cervical adenopathy.   Skin:    "  General: Skin is warm and dry.      Capillary Refill: Capillary refill takes less than 2 seconds.      Coloration: Skin is not cyanotic.      Findings: No petechiae or rash.      Nails: There is no clubbing.   Neurological:      Mental Status: He is alert.   Psychiatric:         Mood and Affect: Mood normal.         Behavior: Behavior is cooperative.           Result Review     The following data was reviewed by CHAPIS Emerson on 01/15/25.      CMP          4/19/2024    20:19   CMP   Glucose 98    BUN 17    Creatinine 1.01    EGFR 97.0    Sodium 138    Potassium 3.6    Chloride 99    Calcium 9.5    Total Protein 7.8    Albumin 4.9    Globulin 2.9    Total Bilirubin 0.7    Alkaline Phosphatase 76    AST (SGOT) 28    ALT (SGPT) 21    Albumin/Globulin Ratio 1.7    BUN/Creatinine Ratio 16.8    Anion Gap 11.4      CBC w/diff          4/19/2024    20:19   CBC w/Diff   WBC 5.80    RBC 5.86    Hemoglobin 16.7    Hematocrit 48.1    MCV 82.1    MCH 28.5    MCHC 34.7    RDW 12.7    Platelets 233    Neutrophil Rel % 51.5    Immature Granulocyte Rel % 0.2    Lymphocyte Rel % 38.1    Monocyte Rel % 6.6    Eosinophil Rel % 3.1    Basophil Rel % 0.5           Results for orders placed during the hospital encounter of 06/19/24    Adult Transthoracic Echo Complete W/ Cont if Necessary Per Protocol    Interpretation Summary    Left ventricular systolic function is normal. Calculated left ventricular EF = 60.7%    Left ventricular diastolic function was normal.    Estimated right ventricular systolic pressure from tricuspid regurgitation is normal (<35 mmHg).    Results for orders placed during the hospital encounter of 06/19/24    Treadmill Stress Test    Interpretation Summary  Adequate aerobic functional capacity.  Maximum workload achieved was 11.8 METS.  Patient reports chest discomfort and shortness of breath within stage III of Emmanuel protocol.  Resting blood pressure was normal with slightly exaggerated blood pressure  response to exercise.  Normal heart rate response to exercise.  No significant arrhythmias were seen.  Baseline ECG shows normal sinus rhythm.  At peak stress, no ischemic ST-T changes were noted.  Impressions are consistent with low risk study.    No results found for this or any previous visit.          Procedures      Assessment & Plan  Implantable loop recorder present  Most recent device check was reviewed and unremarkable.  Continue routine device checks.  Paroxysmal SVT (supraventricular tachycardia)  No further SVT noted on device check.  Continue to monitor clinically.                     Follow Up   Return in about 6 months (around 7/15/2025) for With Dr. Kan.    Patient was given instructions and counseling regarding his condition or for health maintenance advice. Please see specific information pulled into the AVS if appropriate.     Genia Koroma, APRN  01/15/25  14:37 EST    Dictated Utilizing Dragon Dictation

## 2025-07-17 LAB
MDC_IDC_PG_IMPLANT_DTM: NORMAL
MDC_IDC_PG_MFG: NORMAL
MDC_IDC_PG_MODEL: NORMAL
MDC_IDC_PG_SERIAL: NORMAL
MDC_IDC_PG_TYPE: NORMAL
MDC_IDC_SESS_DTM: NORMAL
MDC_IDC_SESS_TYPE: NORMAL

## 2025-08-11 ENCOUNTER — OFFICE VISIT (OUTPATIENT)
Dept: CARDIOLOGY | Facility: CLINIC | Age: 40
End: 2025-08-11
Payer: OTHER GOVERNMENT

## 2025-08-11 VITALS
SYSTOLIC BLOOD PRESSURE: 123 MMHG | HEART RATE: 61 BPM | BODY MASS INDEX: 28.85 KG/M2 | WEIGHT: 169 LBS | HEIGHT: 64 IN | DIASTOLIC BLOOD PRESSURE: 79 MMHG

## 2025-08-11 DIAGNOSIS — Z95.818 IMPLANTABLE LOOP RECORDER PRESENT: ICD-10-CM

## 2025-08-11 DIAGNOSIS — I47.10 PAROXYSMAL SVT (SUPRAVENTRICULAR TACHYCARDIA): Primary | ICD-10-CM

## 2025-08-11 PROCEDURE — 99213 OFFICE O/P EST LOW 20 MIN: CPT | Performed by: NURSE PRACTITIONER

## 2025-08-15 PROCEDURE — 93298 REM INTERROG DEV EVAL SCRMS: CPT | Performed by: INTERNAL MEDICINE
